# Patient Record
Sex: MALE | Race: WHITE | NOT HISPANIC OR LATINO | URBAN - METROPOLITAN AREA
[De-identification: names, ages, dates, MRNs, and addresses within clinical notes are randomized per-mention and may not be internally consistent; named-entity substitution may affect disease eponyms.]

---

## 2023-07-07 ENCOUNTER — TELEPHONE ENCOUNTER (OUTPATIENT)
Dept: URBAN - METROPOLITAN AREA CLINIC 113 | Facility: CLINIC | Age: 43
End: 2023-07-07

## 2023-07-13 ENCOUNTER — WEB ENCOUNTER (OUTPATIENT)
Dept: URBAN - METROPOLITAN AREA CLINIC 113 | Facility: CLINIC | Age: 43
End: 2023-07-13

## 2023-07-13 ENCOUNTER — DASHBOARD ENCOUNTERS (OUTPATIENT)
Age: 43
End: 2023-07-13

## 2023-07-13 ENCOUNTER — OFFICE VISIT (OUTPATIENT)
Dept: URBAN - METROPOLITAN AREA CLINIC 113 | Facility: CLINIC | Age: 43
End: 2023-07-13
Payer: MEDICARE

## 2023-07-13 VITALS
HEART RATE: 69 BPM | RESPIRATION RATE: 16 BRPM | DIASTOLIC BLOOD PRESSURE: 62 MMHG | SYSTOLIC BLOOD PRESSURE: 95 MMHG | TEMPERATURE: 97.3 F

## 2023-07-13 DIAGNOSIS — K94.23 COMPLICATION OF FEEDING TUBE: ICD-10-CM

## 2023-07-13 PROBLEM — 440419004: Status: ACTIVE | Noted: 2023-07-13

## 2023-07-13 PROCEDURE — 99203 OFFICE O/P NEW LOW 30 MIN: CPT

## 2023-07-13 PROCEDURE — 99243 OFF/OP CNSLTJ NEW/EST LOW 30: CPT

## 2023-07-13 RX ORDER — BUPROPION HYDROCHLORIDE 75 MG/1
2 TABLETS TABLET, FILM COATED ORAL TWICE A DAY
Status: ON HOLD | COMMUNITY

## 2023-07-13 RX ORDER — SCOPOLAMINE 1 MG/3D
1 PATCH TO SKIN BEHIND THE EAR AS NEEDED PATCH TRANSDERMAL
Status: ACTIVE | COMMUNITY

## 2023-07-13 RX ORDER — ESCITALOPRAM OXALATE 10 MG/1
1 TABLET TABLET ORAL ONCE A DAY
Status: ACTIVE | COMMUNITY

## 2023-07-13 RX ORDER — ROSUVASTATIN CALCIUM 10 MG/1
1 TABLET TABLET, FILM COATED ORAL ONCE A DAY
Status: ACTIVE | COMMUNITY

## 2023-07-13 RX ORDER — ESOMEPRAZOLE MAGNESIUM 20 MG/1
1 CAPSULE CAPSULE, DELAYED RELEASE ORAL ONCE A DAY
Status: ACTIVE | COMMUNITY

## 2023-07-13 RX ORDER — LORATADINE 10 MG/1
1 TABLET TABLET ORAL ONCE A DAY
Status: ACTIVE | COMMUNITY

## 2023-07-13 RX ORDER — LEVETIRACETAM 100 MG/ML
5 ML SOLUTION ORAL
Status: ACTIVE | COMMUNITY

## 2023-07-13 NOTE — HPI-TODAY'S VISIT:
42-year-old male has been referred by Dr. Nancy Mitchell for feeding tube dysfunction.  A copy of today's visit will be forwarded to the referring provider. He has a history of muscular dystrophy/functional quadriplegia.  He was recently hospitalized for a seizure.  He does have a history of acute ischemic left MCA stroke serving as a needed as for the new onset seizure.  He was started on Keppra was seen by neurology.  His nutritional status had declined secondary to dysphagia and his feeding tube was restarted with tube feeds. Labs 3/29/2023:Unremarkable CMP, normal lipid panel. Abdominal x-ray 6/26/2022:Mild cardiomegaly.  Improving aeration of left lung base.  Scattered atelectasis.  Dobbhoff tube in the proximal stomach.  Recommend advancement.  No extraluminal air. CT scan of abdomen/pelvis 12/15/2019:Left-sided ureteral stone just proximal to the ureterovesicular junction, resulting in mild to moderate proximal hydroureteronephrosis.  Additional punctate nonobstructing renal stones bilaterally.  Cholelithiasis.  He ambulated in a wheelchair. He currently has a G-tube in place. He has two tube feeds per day. The tube flushes well. His main complaint is the looseness of the tube. The external retention ring is about a cm or more from the abdomen. He has leakage around the insertion site. This causes discomfort. The tube was placed around July of last year. He has no other GI complaints at this time.  Upon further investigation, it appears the PEG tube was attempted via EGD with Dr. Tidwell. This was complicated and unsuccessful. IR was then consulted to place the G-tube.

## 2023-07-13 NOTE — PHYSICAL EXAM GASTROINTESTINAL
g-tube noted in LUQ; external retention risk is  from abdomen with apprixmately 1-2 cm gap between skin, tube appears yellow/brown and bolster/port contains brown, hard material